# Patient Record
Sex: MALE | Race: WHITE
[De-identification: names, ages, dates, MRNs, and addresses within clinical notes are randomized per-mention and may not be internally consistent; named-entity substitution may affect disease eponyms.]

---

## 2020-08-14 ENCOUNTER — HOSPITAL ENCOUNTER (OUTPATIENT)
Dept: HOSPITAL 35 - MRI | Age: 75
End: 2020-08-14
Attending: ORTHOPAEDIC SURGERY
Payer: COMMERCIAL

## 2020-08-14 DIAGNOSIS — M51.26: Primary | ICD-10-CM

## 2020-08-14 DIAGNOSIS — M47.816: ICD-10-CM

## 2020-08-14 DIAGNOSIS — M54.41: ICD-10-CM

## 2020-08-14 DIAGNOSIS — Z98.890: ICD-10-CM

## 2020-08-14 DIAGNOSIS — G89.29: ICD-10-CM

## 2020-08-14 DIAGNOSIS — M54.42: ICD-10-CM

## 2020-08-14 DIAGNOSIS — M48.07: ICD-10-CM

## 2020-08-14 LAB
BUN SERPL-MCNC: 53 MG/DL (ref 7–18)
CREAT SERPL-MCNC: 1.4 MG/DL (ref 0.7–1.3)

## 2020-08-28 ENCOUNTER — HOSPITAL ENCOUNTER (OUTPATIENT)
Dept: HOSPITAL 35 - RAD | Age: 75
End: 2020-08-28
Attending: ORTHOPAEDIC SURGERY
Payer: COMMERCIAL

## 2020-08-28 DIAGNOSIS — M25.78: ICD-10-CM

## 2020-08-28 DIAGNOSIS — I70.0: ICD-10-CM

## 2020-08-28 DIAGNOSIS — M51.35: ICD-10-CM

## 2020-08-28 DIAGNOSIS — M51.37: Primary | ICD-10-CM

## 2020-08-28 DIAGNOSIS — M48.07: ICD-10-CM

## 2020-09-21 ENCOUNTER — HOSPITAL ENCOUNTER (OUTPATIENT)
Dept: HOSPITAL 35 - LAB | Age: 75
End: 2020-09-21
Attending: ORTHOPAEDIC SURGERY
Payer: COMMERCIAL

## 2020-09-21 DIAGNOSIS — Z01.812: Primary | ICD-10-CM

## 2020-09-21 DIAGNOSIS — Z20.828: ICD-10-CM

## 2020-09-21 LAB
ALBUMIN SERPL-MCNC: 3.5 G/DL (ref 3.4–5)
ALT SERPL-CCNC: 38 U/L (ref 30–65)
ANION GAP SERPL CALC-SCNC: 10 MMOL/L (ref 7–16)
APTT BLD: 26.7 SECONDS (ref 24.5–32.8)
AST SERPL-CCNC: 21 U/L (ref 15–37)
BASOPHILS NFR BLD AUTO: 0.7 % (ref 0–2)
BILIRUB SERPL-MCNC: 0.7 MG/DL (ref 0.2–1)
BILIRUB UR-MCNC: NEGATIVE MG/DL
BUN SERPL-MCNC: 21 MG/DL (ref 7–18)
CALCIUM SERPL-MCNC: 9.4 MG/DL (ref 8.5–10.1)
CHLORIDE SERPL-SCNC: 102 MMOL/L (ref 98–107)
CO2 SERPL-SCNC: 27 MMOL/L (ref 21–32)
COLOR UR: YELLOW
CREAT SERPL-MCNC: 1 MG/DL (ref 0.7–1.3)
EOSINOPHIL NFR BLD: 2.7 % (ref 0–3)
ERYTHROCYTE [DISTWIDTH] IN BLOOD BY AUTOMATED COUNT: 13.4 % (ref 10.5–14.5)
GLUCOSE SERPL-MCNC: 122 MG/DL (ref 74–106)
GRANULOCYTES NFR BLD MANUAL: 70.8 % (ref 36–66)
HCT VFR BLD CALC: 38.2 % (ref 42–52)
HGB BLD-MCNC: 12.9 GM/DL (ref 14–18)
INR PPP: 1
KETONES UR STRIP-MCNC: NEGATIVE MG/DL
LYMPHOCYTES NFR BLD AUTO: 15.7 % (ref 24–44)
MAGNESIUM SERPL-MCNC: 2 MG/DL (ref 1.8–2.4)
MCH RBC QN AUTO: 30.1 PG (ref 26–34)
MCHC RBC AUTO-ENTMCNC: 33.8 G/DL (ref 28–37)
MCV RBC: 89.2 FL (ref 80–100)
MONOCYTES NFR BLD: 10.1 % (ref 1–8)
NEUTROPHILS # BLD: 8.9 THOU/UL (ref 1.4–8.2)
PLATELET # BLD: 258 THOU/UL (ref 150–400)
POTASSIUM SERPL-SCNC: 4.3 MMOL/L (ref 3.5–5.1)
PROT SERPL-MCNC: 6.7 G/DL (ref 6.4–8.2)
PROTHROMBIN TIME: 10.7 SECONDS (ref 9.3–11.4)
RBC # BLD AUTO: 4.28 MIL/UL (ref 4.5–6)
RBC # UR STRIP: NEGATIVE /UL
SODIUM SERPL-SCNC: 139 MMOL/L (ref 136–145)
SP GR UR STRIP: 1.01 (ref 1–1.03)
URINE CLARITY: CLEAR
URINE GLUCOSE-RANDOM*: NEGATIVE
URINE LEUKOCYTES-REFLEX: NEGATIVE
URINE NITRITE-REFLEX: NEGATIVE
URINE PROTEIN (DIPSTICK): NEGATIVE
UROBILINOGEN UR STRIP-ACNC: 0.2 E.U./DL (ref 0.2–1)
WBC # BLD AUTO: 12.6 THOU/UL (ref 4–11)

## 2020-09-24 ENCOUNTER — HOSPITAL ENCOUNTER (INPATIENT)
Dept: HOSPITAL 35 - 4S | Age: 75
LOS: 4 days | Discharge: HOME HEALTH | DRG: 517 | End: 2020-09-28
Attending: ORTHOPAEDIC SURGERY | Admitting: ORTHOPAEDIC SURGERY
Payer: COMMERCIAL

## 2020-09-24 VITALS — DIASTOLIC BLOOD PRESSURE: 47 MMHG | SYSTOLIC BLOOD PRESSURE: 101 MMHG

## 2020-09-24 VITALS — DIASTOLIC BLOOD PRESSURE: 51 MMHG | SYSTOLIC BLOOD PRESSURE: 123 MMHG

## 2020-09-24 VITALS — HEIGHT: 70.98 IN | BODY MASS INDEX: 35 KG/M2 | WEIGHT: 250 LBS

## 2020-09-24 VITALS — DIASTOLIC BLOOD PRESSURE: 73 MMHG | SYSTOLIC BLOOD PRESSURE: 131 MMHG

## 2020-09-24 VITALS — SYSTOLIC BLOOD PRESSURE: 135 MMHG | DIASTOLIC BLOOD PRESSURE: 69 MMHG

## 2020-09-24 DIAGNOSIS — Z79.899: ICD-10-CM

## 2020-09-24 DIAGNOSIS — M48.062: Primary | ICD-10-CM

## 2020-09-24 DIAGNOSIS — D72.829: ICD-10-CM

## 2020-09-24 DIAGNOSIS — Z85.828: ICD-10-CM

## 2020-09-24 DIAGNOSIS — I25.10: ICD-10-CM

## 2020-09-24 DIAGNOSIS — E78.5: ICD-10-CM

## 2020-09-24 DIAGNOSIS — M48.07: ICD-10-CM

## 2020-09-24 DIAGNOSIS — Z95.5: ICD-10-CM

## 2020-09-24 DIAGNOSIS — Z87.891: ICD-10-CM

## 2020-09-24 DIAGNOSIS — Z79.82: ICD-10-CM

## 2020-09-24 DIAGNOSIS — I10: ICD-10-CM

## 2020-09-24 DIAGNOSIS — Z96.653: ICD-10-CM

## 2020-09-24 DIAGNOSIS — M51.17: ICD-10-CM

## 2020-09-24 PROCEDURE — 01NB0ZZ RELEASE LUMBAR NERVE, OPEN APPROACH: ICD-10-PCS | Performed by: ORTHOPAEDIC SURGERY

## 2020-09-24 PROCEDURE — 56524: CPT

## 2020-09-24 PROCEDURE — 50010 RENAL EXPLORATION: CPT

## 2020-09-24 PROCEDURE — 01NR0ZZ RELEASE SACRAL NERVE, OPEN APPROACH: ICD-10-PCS | Performed by: ORTHOPAEDIC SURGERY

## 2020-09-24 PROCEDURE — 50701: CPT

## 2020-09-24 PROCEDURE — 50712: CPT

## 2020-09-24 PROCEDURE — 51878: CPT

## 2020-09-24 PROCEDURE — 51412: CPT

## 2020-09-24 PROCEDURE — 56529: CPT

## 2020-09-24 PROCEDURE — 62110: CPT

## 2020-09-24 PROCEDURE — 62900: CPT

## 2020-09-24 PROCEDURE — 10102: CPT

## 2020-09-24 PROCEDURE — 50402: CPT

## 2020-09-24 PROCEDURE — 70005: CPT

## 2020-09-24 PROCEDURE — 56528: CPT

## 2020-09-24 PROCEDURE — 50101: CPT

## 2020-09-24 PROCEDURE — 56526: CPT

## 2020-09-24 NOTE — NUR
ASSESSMENT:CM REVIEWED CHART AND MET WITH PT. PT IS S/P BILATERAL LAMINECTOMY.
PT IS ALERT AND ORIENTED X4. PT REPORTS LIVING IN A HOUSE ALONE. PTS GOOD
FRIEND KESHA IS AT THE BEDSIDE. PT REPORTS HE HAS A CANE AND WALKER AT HOME
FOR AMBULATION. PT REPORTS BEING INDEPENDENT WUTH ADLS, PT STATES HE HAS HAD
HH IN THE PAST BUT UNSURE THE AGENCY. PT IS TO WORK WITH THERAPIES. PT DOES
NOT ANTICIPATE ANY NEEDS AT DISCHARGE. CM WILL CONTINUE TO FOLLOW.

## 2020-09-24 NOTE — NUR
PT ARRIVED AT 1325 PT ALERT XS 4. FEMALE FRIEND AT BEDSIDE. PCA PUMP WITH
FENTANAL SET UP BY 2 NURSES AND INFUSING AS ORDERED. PT'S ADMIT PAPERWORK XS 3
DONE AND COMPUTER V. 97.7 18 77 131/73 LEFT ARM. O2 SAT = 100 % WEIGHT= 250
LBS HEIGHT = 5'11'' CO 2 MONITOR. CONNECTED. PT PLEASANT AND COOPERATIVE WITH
CARE.

## 2020-09-25 VITALS — DIASTOLIC BLOOD PRESSURE: 52 MMHG | SYSTOLIC BLOOD PRESSURE: 118 MMHG

## 2020-09-25 VITALS — DIASTOLIC BLOOD PRESSURE: 50 MMHG | SYSTOLIC BLOOD PRESSURE: 147 MMHG

## 2020-09-25 VITALS — DIASTOLIC BLOOD PRESSURE: 49 MMHG | SYSTOLIC BLOOD PRESSURE: 109 MMHG

## 2020-09-25 LAB
ANION GAP SERPL CALC-SCNC: 6 MMOL/L (ref 7–16)
BASOPHILS NFR BLD AUTO: 0.1 % (ref 0–2)
BUN SERPL-MCNC: 26 MG/DL (ref 7–18)
CALCIUM SERPL-MCNC: 8.7 MG/DL (ref 8.5–10.1)
CHLORIDE SERPL-SCNC: 102 MMOL/L (ref 98–107)
CO2 SERPL-SCNC: 29 MMOL/L (ref 21–32)
CREAT SERPL-MCNC: 1.1 MG/DL (ref 0.7–1.3)
EOSINOPHIL NFR BLD: 0 % (ref 0–3)
ERYTHROCYTE [DISTWIDTH] IN BLOOD BY AUTOMATED COUNT: 13.5 % (ref 10.5–14.5)
GLUCOSE SERPL-MCNC: 147 MG/DL (ref 74–106)
GRANULOCYTES NFR BLD MANUAL: 83.6 % (ref 36–66)
HCT VFR BLD CALC: 31.2 % (ref 42–52)
HGB BLD-MCNC: 10.6 GM/DL (ref 14–18)
LYMPHOCYTES NFR BLD AUTO: 6.5 % (ref 24–44)
MAGNESIUM SERPL-MCNC: 2.1 MG/DL (ref 1.8–2.4)
MCH RBC QN AUTO: 30.3 PG (ref 26–34)
MCHC RBC AUTO-ENTMCNC: 33.8 G/DL (ref 28–37)
MCV RBC: 89.6 FL (ref 80–100)
MONOCYTES NFR BLD: 9.8 % (ref 1–8)
NEUTROPHILS # BLD: 14.4 THOU/UL (ref 1.4–8.2)
PLATELET # BLD: 222 THOU/UL (ref 150–400)
POTASSIUM SERPL-SCNC: 4.5 MMOL/L (ref 3.5–5.1)
RBC # BLD AUTO: 3.48 MIL/UL (ref 4.5–6)
SODIUM SERPL-SCNC: 137 MMOL/L (ref 136–145)
WBC # BLD AUTO: 17.2 THOU/UL (ref 4–11)

## 2020-09-25 NOTE — NUR
DR CASILLAS HERE AND STATED PATIENT MAY RAISE HEAD TO 30 DEGREES AS TOLERATED
TODAY 9/25/20 AND THEN 9/26/20 ADVANCE AS TOLERATED TOWARD NORMAL SITTING.

## 2020-09-25 NOTE — NUR
PT ON THE FENTANYL PCA. PAIN AT SHIFT CHANGE WAS 5/10. PAIN AT 2100 WAS DOWN
TO 3/10. PT WANTS TO USE THE PERCOCET INSTEAD OF THE PCA PUMP. PAIN DOWN TO 1
AT 2300HRS. IN 4 HOURS, PT HAD USED ONLY 30MCG OF THE FENTANYL. PCA DCD AT
0100, PT STATING HIS PAIN AT A ZERO..

## 2020-09-25 NOTE — O
[facility]
[place]   [address]                     OPERATIVE REPORT              
_______________________________________________________________________________
 
Name:       EDUIN ECHAVARRIA                  Room #:         442-P       ADM IN  
M.R.#:      [id number]                       Account #:      [id number]  
Admission:  09/24/20    Attend Phys:    Jaswinder Ordonez MD  
Discharge:              Date of Birth:  [birth year]  
                                                          Report #: [id number]
                                                                    [id number]   
_______________________________________________________________________________
THIS REPORT FOR:  
 
cc:  Jono Carrizales MD,Jono Ordonez,Jaswinder MARTINI MD                                          ~
CC: Jaswinder Carrizales
 
DATE OF SERVICE:  09/24/2020
 
 
PREOPERATIVE DIAGNOSES:  L2-S1 degenerative disk disease, spondylosis L2-S1
stenosis; previous decompression L4-L5 and L5-S1; foraminal stenosis, left much
greater than right multilevel; right L4-L5 herniated nucleus pulposus; low back
pain, radiculopathy and neurogenic claudication.
 
POSTOPERATIVE DIAGNOSES:  L2-S1 degenerative disk disease, spondylosis L2-S1
stenosis; previous decompression L4-L5 and L5-S1; foraminal stenosis, left much
greater than right multilevel; right L4-L5 herniated nucleus pulposus; low back
pain, radiculopathy and neurogenic claudication.  Additionally, postoperatively
is dural laceration.
 
PROCEDURES:  Bilateral laminectomy with partial facetectomy and neural
foraminotomy of L2, bilateral laminectomy with partial facetectomy and neural
foraminotomy L3, bilateral laminectomy with partial facetectomy and neural
foraminotomy redo L4, bilateral laminectomy with partial facetectomy and neural
foraminotomy redo L5, bilateral laminectomy with partial facetectomy and neural
foraminotomy redo S1.
 
SURGEON:  Jaswinder Ordonez MD
 
ASSISTANT SURGEON:  DIOMEDES Patel.
 
ANESTHETIC:  General via endotracheal tube.
 
INDICATIONS:  The patient has had intractable back and bilateral leg pain.  He
has proved refractory to all forms of multimodality conservative management.  He
is requesting we proceed with operative intervention.  He understands the risks
of surgery to be death, DVT, pulmonary embolism, paraplegia, loss of bowel and
bladder function, loss of sexual function, possibility of bleeding, bleeding
requiring transfusion, transfusion attendant risks of AIDS and hepatitis
infection, instability, dural laceration and recurrence.
 
DESCRIPTION OF PROCEDURE:  He was brought to the operating room, administered
general anesthesia via endotracheal tube.  Lower extremities were treated with
MARIE hose and intermittent compression stockings, Oneill catheter was placed.  He
 
 
 
[address]   [address]                     OPERATIVE REPORT              
_______________________________________________________________________________
 
Name:       EDUIN ECHAVARRIA KOKI                  Room #:         442-P       [facility] [place]  
.R.#:      [id number]                       Account #:      [id number]  
Admission:  09/24/20    Attend Phys:    Jaswinder Ordonez MD  
Discharge:              Date of Birth:  [birth year]  
                                                          Report #: [id number]
                                                                    [id number]   
_______________________________________________________________________________
was positioned on the Perfecto table in the prone position with all bony
prominences padded appropriately.  Care was taken to ensure the shoulders were
not abducted more than 90 degrees or the elbows flexed more than 90 degrees. 
There was no undue pressure on the cubital or carpal canals.  The area of the
anterior superior iliac spine was well padded to protect the lateral femoral
cutaneous nerve.  Hips and knees were well padded and there was no pressure on
the dorsum of the feet.  The patient's low back was defatted with alcohol,
visualized under fluoroscopy and the pedicles of L2 through S1 were marked on
the patient's back for surgical reference.  We then infiltrated the skin with
0.5% Marcaine, 1:200,000 epinephrine and sharp dissection was continued down
through the skin and the subcutaneous tissues to the level of the deep fascia. 
At the level of the deep fascia, the tips of spinous processes that remained
were subperiosteally exposed.  Care was taken to not inadvertently penetrate the
unprotected dura at the L4, L5 and S1 levels.  We eventually obtained a
subperiosteal dissection with both sharp and cautery dissection, maintaining the
facet capsules at all levels at all times and exposed the laminas from pars to
pars from L2 to the sacrum.  The deep self-retaining retractors were then
placed.  We obtained an intraoperative x-ray just to document our level and once
that was documented, we removed the spinous process of L2, L3 and the remnant of
L4 and carefully dissected the previous areas of decompression, L4, L5 and S1 to
avoid inadvertent dural penetration.  We defined the bony margins and got a good
subperiosteal dissection.  We then used the high speed drill to thin the lamina
of L2, L3 and L4 remnant to its anterior cortex and then a micro curette was
used to separate the ligamentum flavum from the undersurface of the lamina. 
Then, the lamina was resected.  Once the lamina was resected to obtain a central
decompression, we resected the intervening ligamentum flavum, then the following
lamina, so after the L3 lamina was removed, we resected the remnant of L4.  With
the remnant of L4, we had a complete central decompression.  We then turned our
attention to the redo levels.  This was re-exposed using micro curettes under
loupe magnification and headlight illumination  the scarred dura from
the undersurface of the lamina and then resecting the lamina or the ligament,
whichever was the case.  We eventually had a wide central decompression from
L2-S1.  With the decompressions completed centrally, we then started cephalad on
the right and marched down the lateral recess performing partial facetectomies
and ligament resection to affect a lateral recess decompression at the L2, L3,
L4, L5 and S1 levels.  Once this was accomplished, we performed neural foraminal
decompressions on the right, completely decompressing the nerve root.  We then
explored the L4-L5 disk and although there was a bulge due to the length and the
degree of decompression, it was providing no focal pressure on the L5 nerve root
on the right.  We therefore left the disk intact.  We then turned our attention
to the left side and the patient's dissection was widened as previously
described, performing partial facetectomies and ligament resections until we
neared the interface between the virgin L3 level and the L4 level.  At this
level, we ran into some scar that was severely adherent.  It was also adherent
to a spur that was penetrating and almost to the floor of the canal off the
facet and in the process of dissection, a dural laceration was incurred.  The
 
 
 
[address]   [address]                     OPERATIVE REPORT              
_______________________________________________________________________________
 
Name:       EDUIN ECHAVARRIA KOKI                  Room #:         442-P       [facility] [place]  
..#:      [id number]                       Account #:      [id number]  
Admission:  09/24/20    Attend Phys:    Jaswinder Ordonez MD  
Discharge:              Date of Birth:  [birth year]  
                                                          Report #: [id number]
                                                                    [id number]   
_______________________________________________________________________________
dural laceration clearly showed no evidence of injury to the nerve roots.  It
had defined edges and we were able to place 5-6 stitches of 6-0 Prolene in the
laceration and it was watertight for the remainder of the case.  We then
continued the dissection completing the lateral recess and neural foraminal
decompressions on the left at L2, L3, L4 and L5 and S1.  With the decompression
now complete, the dural laceration repaired and apparently dry for the entire
case, we irrigated with a liter of antibiotic-containing solution, placed
Duragen over the laceration and then DuraSeal to complete the repair of the
dura.  We then closed the deep fascial layer with 0 Ethibond in figure-of-eight
interrupted fashion.  We oversewed that repair with 0 Prolene in running fashion
and then deep subQ with 0 Vicryl, superficial subQ with 2-0 Vicryl and skin with
stainless steel staples, dressed the wound with Xeroform, sterile dressing,
sponges and a bioclusive.  The patient tolerated the procedure well and was
physiologically stable throughout.  The small half-inch dural laceration was the
only technical misadventure.  It was repaired uneventfully and appeared
watertight throughout the remainder of the case.  The patient was being
transported to the recovery room for close serial neurovascular observation and
discharge for once stable to continue prophylactic antibiotic and serial
neurovascular exams.
 
 
 
 
 
 
 
 
 
 
 
 
 
 
 
 
 
 
 
 
 
 
 
 
 
  <ELECTRONICALLY SIGNED>
   By: Jaswinder Ordonez MD          
  09/25/20     0944
D: 09/24/20 1206                           _____________________________________
T: 09/24/20 1311                           Jaswinder Ordonez MD            /nt

## 2020-09-26 VITALS — DIASTOLIC BLOOD PRESSURE: 55 MMHG | SYSTOLIC BLOOD PRESSURE: 150 MMHG

## 2020-09-26 VITALS — SYSTOLIC BLOOD PRESSURE: 150 MMHG | DIASTOLIC BLOOD PRESSURE: 55 MMHG

## 2020-09-26 VITALS — DIASTOLIC BLOOD PRESSURE: 55 MMHG | SYSTOLIC BLOOD PRESSURE: 131 MMHG

## 2020-09-26 VITALS — SYSTOLIC BLOOD PRESSURE: 119 MMHG | DIASTOLIC BLOOD PRESSURE: 45 MMHG

## 2020-09-26 LAB
ANION GAP SERPL CALC-SCNC: 4 MMOL/L (ref 7–16)
BASOPHILS NFR BLD AUTO: 0.2 % (ref 0–2)
BUN SERPL-MCNC: 17 MG/DL (ref 7–18)
CALCIUM SERPL-MCNC: 8.8 MG/DL (ref 8.5–10.1)
CHLORIDE SERPL-SCNC: 101 MMOL/L (ref 98–107)
CO2 SERPL-SCNC: 32 MMOL/L (ref 21–32)
CREAT SERPL-MCNC: 1 MG/DL (ref 0.7–1.3)
EOSINOPHIL NFR BLD: 0.4 % (ref 0–3)
ERYTHROCYTE [DISTWIDTH] IN BLOOD BY AUTOMATED COUNT: 13.7 % (ref 10.5–14.5)
GLUCOSE SERPL-MCNC: 120 MG/DL (ref 74–106)
GRANULOCYTES NFR BLD MANUAL: 74.2 % (ref 36–66)
HCT VFR BLD CALC: 32.2 % (ref 42–52)
HGB BLD-MCNC: 10.8 GM/DL (ref 14–18)
LYMPHOCYTES NFR BLD AUTO: 13.5 % (ref 24–44)
MCH RBC QN AUTO: 30.3 PG (ref 26–34)
MCHC RBC AUTO-ENTMCNC: 33.6 G/DL (ref 28–37)
MCV RBC: 90.1 FL (ref 80–100)
MONOCYTES NFR BLD: 11.7 % (ref 1–8)
NEUTROPHILS # BLD: 9.5 THOU/UL (ref 1.4–8.2)
PLATELET # BLD: 225 THOU/UL (ref 150–400)
POTASSIUM SERPL-SCNC: 4 MMOL/L (ref 3.5–5.1)
RBC # BLD AUTO: 3.57 MIL/UL (ref 4.5–6)
SODIUM SERPL-SCNC: 137 MMOL/L (ref 136–145)
WBC # BLD AUTO: 12.8 THOU/UL (ref 4–11)

## 2020-09-26 NOTE — NUR
PT BEEN RESTING QUIETLY MOST OF THE NIGHT. HOB ELEVATED TO 30 DEGREES AND
PATIENT TOLERATING FINE. NO COMPLAINS OF HEADACHE. VSS. HAM TO D/D WITH GOOD
U/O. DRSG TO LOWER BACK C/D/I.

## 2020-09-26 NOTE — NUR
PT IS A&OX4, VSS, UP WITH ONE PERSON ASSIST TO CHAIR. PT SAW PATIENT TODAY.
PATIENT IS IN CALM MOOD AND PLEASANT. PATIENT DENIES PAIN. RECOVERY IS GOING
WELL. FALL PRECAUTIONS IN PLACE, WILL CONTINUE TO MONITOR.

## 2020-09-27 VITALS — DIASTOLIC BLOOD PRESSURE: 53 MMHG | SYSTOLIC BLOOD PRESSURE: 126 MMHG

## 2020-09-27 VITALS — SYSTOLIC BLOOD PRESSURE: 133 MMHG | DIASTOLIC BLOOD PRESSURE: 65 MMHG

## 2020-09-27 VITALS — SYSTOLIC BLOOD PRESSURE: 135 MMHG | DIASTOLIC BLOOD PRESSURE: 72 MMHG

## 2020-09-27 LAB
ANION GAP SERPL CALC-SCNC: 9 MMOL/L (ref 7–16)
BASOPHILS NFR BLD AUTO: 0.3 % (ref 0–2)
BUN SERPL-MCNC: 17 MG/DL (ref 7–18)
CALCIUM SERPL-MCNC: 9 MG/DL (ref 8.5–10.1)
CHLORIDE SERPL-SCNC: 99 MMOL/L (ref 98–107)
CO2 SERPL-SCNC: 27 MMOL/L (ref 21–32)
CREAT SERPL-MCNC: 0.9 MG/DL (ref 0.7–1.3)
EOSINOPHIL NFR BLD: 1.9 % (ref 0–3)
ERYTHROCYTE [DISTWIDTH] IN BLOOD BY AUTOMATED COUNT: 13.7 % (ref 10.5–14.5)
GLUCOSE SERPL-MCNC: 121 MG/DL (ref 74–106)
GRANULOCYTES NFR BLD MANUAL: 72.3 % (ref 36–66)
HCT VFR BLD CALC: 32 % (ref 42–52)
HGB BLD-MCNC: 10.8 GM/DL (ref 14–18)
LYMPHOCYTES NFR BLD AUTO: 14.8 % (ref 24–44)
MCH RBC QN AUTO: 30.2 PG (ref 26–34)
MCHC RBC AUTO-ENTMCNC: 33.7 G/DL (ref 28–37)
MCV RBC: 89.5 FL (ref 80–100)
MONOCYTES NFR BLD: 10.7 % (ref 1–8)
NEUTROPHILS # BLD: 9.3 THOU/UL (ref 1.4–8.2)
PLATELET # BLD: 231 THOU/UL (ref 150–400)
POTASSIUM SERPL-SCNC: 4.2 MMOL/L (ref 3.5–5.1)
RBC # BLD AUTO: 3.58 MIL/UL (ref 4.5–6)
SODIUM SERPL-SCNC: 135 MMOL/L (ref 136–145)
WBC # BLD AUTO: 12.8 THOU/UL (ref 4–11)

## 2020-09-27 NOTE — NUR
PT IS PLEASNT. COOPERATIVE. TOLERATING HOB AT 30 DEGREES THROUGHOUT THE NOC.
DENIES NAUSEA OR HEADACHE. AFEBRILE. GOOD U/O PER HAM. PAIN WELL MANAGED BY
PERCOCET.

## 2020-09-27 NOTE — NUR
PT ASSESSED AT START OF SHIFT. PROGRESSING WELL. WALKED W/ THERAPYIST DOWN THE
LUTHER AND BACK USING WALKER. STATED BACK FEELS SO MUCH BETTER. IS TAKING PAIN
MEDS. FATOUMATA DC'D PER PROTOCOL. DSNG W/ DRIED DRAINAGE. PLAN FOR DC TOMORROW
PER

## 2020-09-28 VITALS — DIASTOLIC BLOOD PRESSURE: 69 MMHG | SYSTOLIC BLOOD PRESSURE: 129 MMHG

## 2020-09-28 VITALS — DIASTOLIC BLOOD PRESSURE: 94 MMHG | SYSTOLIC BLOOD PRESSURE: 192 MMHG

## 2020-09-28 VITALS — SYSTOLIC BLOOD PRESSURE: 129 MMHG | DIASTOLIC BLOOD PRESSURE: 69 MMHG

## 2020-09-28 LAB
ANION GAP SERPL CALC-SCNC: 8 MMOL/L (ref 7–16)
BASOPHILS NFR BLD AUTO: 0.5 % (ref 0–2)
BUN SERPL-MCNC: 14 MG/DL (ref 7–18)
CALCIUM SERPL-MCNC: 9.3 MG/DL (ref 8.5–10.1)
CHLORIDE SERPL-SCNC: 96 MMOL/L (ref 98–107)
CO2 SERPL-SCNC: 29 MMOL/L (ref 21–32)
CREAT SERPL-MCNC: 0.9 MG/DL (ref 0.7–1.3)
EOSINOPHIL NFR BLD: 2.2 % (ref 0–3)
ERYTHROCYTE [DISTWIDTH] IN BLOOD BY AUTOMATED COUNT: 13.7 % (ref 10.5–14.5)
GLUCOSE SERPL-MCNC: 132 MG/DL (ref 74–106)
GRANULOCYTES NFR BLD MANUAL: 76.3 % (ref 36–66)
HCT VFR BLD CALC: 32.2 % (ref 42–52)
HGB BLD-MCNC: 11 GM/DL (ref 14–18)
LYMPHOCYTES NFR BLD AUTO: 11.3 % (ref 24–44)
MCH RBC QN AUTO: 30.4 PG (ref 26–34)
MCHC RBC AUTO-ENTMCNC: 34.1 G/DL (ref 28–37)
MCV RBC: 89.3 FL (ref 80–100)
MONOCYTES NFR BLD: 9.7 % (ref 1–8)
NEUTROPHILS # BLD: 9.2 THOU/UL (ref 1.4–8.2)
PLATELET # BLD: 258 THOU/UL (ref 150–400)
POTASSIUM SERPL-SCNC: 4.1 MMOL/L (ref 3.5–5.1)
RBC # BLD AUTO: 3.61 MIL/UL (ref 4.5–6)
SODIUM SERPL-SCNC: 133 MMOL/L (ref 136–145)
WBC # BLD AUTO: 12 THOU/UL (ref 4–11)

## 2020-09-28 NOTE — NUR
PT UP IN BEDSIDE CHAIR FOR BREAKFAST. TOOK AM MEDS. PT HAS NO PAIN AND IS
VOIDING W/O DIFF HAM WAS REMOVED. PT TO DISCHARGE TO HOME WITH HOME HEALTH
TODAY.

## 2020-09-28 NOTE — NUR
PT AMBULATING TO BATHROOM WITH WALKER AND ASSIST X1 AND IS TOLERATING FAIR.
PERCOCET PROVIDING PAIN RELIEF ALTHOUGH IT DOES MAKE PT A LITTLE CONFUSED.  PT
DID FINALLY VOID POST CATHETER REMOVAL.  RESTING COMFORTABLY.  NO NEEDS
VOICED.  CALL LIGHT WITHIN REACH.  FREQUENT OBSERVATION.

## 2020-11-12 ENCOUNTER — HOSPITAL ENCOUNTER (OUTPATIENT)
Dept: HOSPITAL 35 - MRI | Age: 75
End: 2020-11-12
Attending: ORTHOPAEDIC SURGERY
Payer: COMMERCIAL

## 2020-11-12 DIAGNOSIS — M51.26: ICD-10-CM

## 2020-11-12 DIAGNOSIS — G89.29: ICD-10-CM

## 2020-11-12 DIAGNOSIS — M41.86: Primary | ICD-10-CM

## 2020-11-12 DIAGNOSIS — M48.07: ICD-10-CM

## 2020-11-12 LAB
BUN SERPL-MCNC: 19 MG/DL (ref 7–18)
CREAT SERPL-MCNC: 1.3 MG/DL (ref 0.7–1.3)

## 2020-11-20 ENCOUNTER — HOSPITAL ENCOUNTER (OUTPATIENT)
Dept: HOSPITAL 35 - LAB | Age: 75
End: 2020-11-20
Attending: ORTHOPAEDIC SURGERY
Payer: COMMERCIAL

## 2020-11-20 DIAGNOSIS — Z20.828: Primary | ICD-10-CM

## 2020-11-20 LAB
ALBUMIN SERPL-MCNC: 3.9 G/DL (ref 3.4–5)
ALT SERPL-CCNC: 31 U/L (ref 30–65)
ANION GAP SERPL CALC-SCNC: 11 MMOL/L (ref 7–16)
APTT BLD: 24.3 SECONDS (ref 24.5–32.8)
AST SERPL-CCNC: 21 U/L (ref 15–37)
BASOPHILS NFR BLD AUTO: 0.3 % (ref 0–2)
BILIRUB SERPL-MCNC: 0.8 MG/DL (ref 0.2–1)
BILIRUB UR-MCNC: NEGATIVE MG/DL
BUN SERPL-MCNC: 19 MG/DL (ref 7–18)
CALCIUM SERPL-MCNC: 9.9 MG/DL (ref 8.5–10.1)
CHLORIDE SERPL-SCNC: 96 MMOL/L (ref 98–107)
CO2 SERPL-SCNC: 28 MMOL/L (ref 21–32)
COLOR UR: YELLOW
CREAT SERPL-MCNC: 1.2 MG/DL (ref 0.7–1.3)
EOSINOPHIL NFR BLD: 1.3 % (ref 0–3)
ERYTHROCYTE [DISTWIDTH] IN BLOOD BY AUTOMATED COUNT: 12.7 % (ref 10.5–14.5)
GLUCOSE SERPL-MCNC: 116 MG/DL (ref 74–106)
GRANULOCYTES NFR BLD MANUAL: 72.8 % (ref 36–66)
HCT VFR BLD CALC: 42.6 % (ref 42–52)
HGB BLD-MCNC: 13.9 GM/DL (ref 14–18)
INR PPP: 1
KETONES UR STRIP-MCNC: NEGATIVE MG/DL
LYMPHOCYTES NFR BLD AUTO: 17.6 % (ref 24–44)
MAGNESIUM SERPL-MCNC: 2.1 MG/DL (ref 1.8–2.4)
MCH RBC QN AUTO: 29.5 PG (ref 26–34)
MCHC RBC AUTO-ENTMCNC: 32.7 G/DL (ref 28–37)
MCV RBC: 90.3 FL (ref 80–100)
MONOCYTES NFR BLD: 8 % (ref 1–8)
NEUTROPHILS # BLD: 10.4 THOU/UL (ref 1.4–8.2)
PLATELET # BLD: 291 THOU/UL (ref 150–400)
POTASSIUM SERPL-SCNC: 4.1 MMOL/L (ref 3.5–5.1)
PROT SERPL-MCNC: 7 G/DL (ref 6.4–8.2)
PROTHROMBIN TIME: 10.7 SECONDS (ref 9.3–11.4)
RBC # BLD AUTO: 4.72 MIL/UL (ref 4.5–6)
RBC # UR STRIP: NEGATIVE /UL
SODIUM SERPL-SCNC: 135 MMOL/L (ref 136–145)
SP GR UR STRIP: >= 1.03 (ref 1–1.03)
URINE CLARITY: CLEAR
URINE GLUCOSE-RANDOM*: NEGATIVE
URINE LEUKOCYTES-REFLEX: NEGATIVE
URINE NITRITE-REFLEX: NEGATIVE
URINE PROTEIN (DIPSTICK): NEGATIVE
UROBILINOGEN UR STRIP-ACNC: 0.2 E.U./DL (ref 0.2–1)
WBC # BLD AUTO: 14.3 THOU/UL (ref 4–11)

## 2020-11-20 NOTE — EKG
[facility]
[address]
[place]   [address]                     ELECTROCARDIOGRAM REPORT      
_______________________________________________________________________________
 
Name:       IRASEMA ECHAVARRIAY T                  Room #:                     PRE IN  
M..#:      [id number]                       Account #:      [id number]  
Admission:              Attend Phys:    Jaswinder Ordonez MD  
Discharge:              Date of Birth:  [birth year]  
                                                          Report #: [id number]
                                                                    [id number]
_______________________________________________________________________________
THIS REPORT FOR:  
 
cc:  Jono Carrizales MD, Michele MD Santiago, Patrick MD [facility]                                         ~
THIS REPORT FOR:   //name//                          
 
                          [facility]
                                       
Test Date:    2020               Test Time:    13:23:34
Pat Name:     EDUIN ECHAVARRIA               Department:   
Patient ID:   [id number]            Room:          
Gender:                               Technician:   [facility]
:          1945               Requested By: Jaswinder Ordonez
Order Number: [id number] MD:   Christian Lance
                                 Measurements
Intervals                              Axis          
Rate:         55                       P:            44
NV:           176                      QRS:          20
QRSD:         98                       T:            35
QT:           442                                    
QTc:          423                                    
                           Interpretive Statements
Sinus rhythm
Abnormal R-wave progression, early transition
No previous ECG available for comparison
Electronically Signed On 2020 13:29:38 CST by Christian Lance
[email or web address]
 
 
 
 
 
 
 
 
 
 
 
 
 
 
 
 
  <ELECTRONICALLY SIGNED>
   By: Christian Lance MD, FACC    
  20     1329
D: 20 1323                           _____________________________________
T: 20 1323                           Christian Lance MD, FACC      /EPI

## 2020-11-24 ENCOUNTER — HOSPITAL ENCOUNTER (INPATIENT)
Dept: HOSPITAL 35 - TBA | Age: 75
LOS: 3 days | Discharge: HOME HEALTH | DRG: 520 | End: 2020-11-27
Attending: ORTHOPAEDIC SURGERY | Admitting: ORTHOPAEDIC SURGERY
Payer: COMMERCIAL

## 2020-11-24 VITALS — DIASTOLIC BLOOD PRESSURE: 70 MMHG | SYSTOLIC BLOOD PRESSURE: 124 MMHG

## 2020-11-24 VITALS — SYSTOLIC BLOOD PRESSURE: 140 MMHG | DIASTOLIC BLOOD PRESSURE: 71 MMHG

## 2020-11-24 VITALS — SYSTOLIC BLOOD PRESSURE: 124 MMHG | DIASTOLIC BLOOD PRESSURE: 64 MMHG

## 2020-11-24 VITALS — DIASTOLIC BLOOD PRESSURE: 74 MMHG | SYSTOLIC BLOOD PRESSURE: 132 MMHG

## 2020-11-24 VITALS — SYSTOLIC BLOOD PRESSURE: 130 MMHG | DIASTOLIC BLOOD PRESSURE: 77 MMHG

## 2020-11-24 VITALS — WEIGHT: 199 LBS | BODY MASS INDEX: 27.86 KG/M2 | HEIGHT: 70.98 IN

## 2020-11-24 VITALS — DIASTOLIC BLOOD PRESSURE: 70 MMHG | SYSTOLIC BLOOD PRESSURE: 121 MMHG

## 2020-11-24 VITALS — SYSTOLIC BLOOD PRESSURE: 130 MMHG | DIASTOLIC BLOOD PRESSURE: 74 MMHG

## 2020-11-24 DIAGNOSIS — M54.17: ICD-10-CM

## 2020-11-24 DIAGNOSIS — Z86.19: ICD-10-CM

## 2020-11-24 DIAGNOSIS — M48.07: Primary | ICD-10-CM

## 2020-11-24 DIAGNOSIS — I10: ICD-10-CM

## 2020-11-24 DIAGNOSIS — Z96.653: ICD-10-CM

## 2020-11-24 DIAGNOSIS — I25.10: ICD-10-CM

## 2020-11-24 DIAGNOSIS — E78.5: ICD-10-CM

## 2020-11-24 DIAGNOSIS — E78.00: ICD-10-CM

## 2020-11-24 DIAGNOSIS — Z87.891: ICD-10-CM

## 2020-11-24 DIAGNOSIS — Z95.5: ICD-10-CM

## 2020-11-24 PROCEDURE — 70005: CPT

## 2020-11-24 PROCEDURE — 009U3ZZ DRAINAGE OF SPINAL CANAL, PERCUTANEOUS APPROACH: ICD-10-PCS | Performed by: ORTHOPAEDIC SURGERY

## 2020-11-24 PROCEDURE — 62110: CPT

## 2020-11-24 PROCEDURE — 62900: CPT

## 2020-11-24 PROCEDURE — 01NB0ZZ RELEASE LUMBAR NERVE, OPEN APPROACH: ICD-10-PCS | Performed by: ORTHOPAEDIC SURGERY

## 2020-11-24 PROCEDURE — 01NR0ZZ RELEASE SACRAL NERVE, OPEN APPROACH: ICD-10-PCS | Performed by: ORTHOPAEDIC SURGERY

## 2020-11-24 PROCEDURE — 50701: CPT

## 2020-11-24 PROCEDURE — 00QT0ZZ REPAIR SPINAL MENINGES, OPEN APPROACH: ICD-10-PCS | Performed by: ORTHOPAEDIC SURGERY

## 2020-11-24 PROCEDURE — 10102: CPT

## 2020-11-24 PROCEDURE — 56528: CPT

## 2020-11-24 PROCEDURE — 50850: CPT

## 2020-11-24 PROCEDURE — 50101: CPT

## 2020-11-24 PROCEDURE — 50402: CPT

## 2020-11-24 PROCEDURE — 50010 RENAL EXPLORATION: CPT

## 2020-11-24 PROCEDURE — 51878: CPT

## 2020-11-24 PROCEDURE — 56529: CPT

## 2020-11-24 PROCEDURE — 65130 INSERT OCULAR IMPLANT: CPT

## 2020-11-24 PROCEDURE — 00NY0ZZ RELEASE LUMBAR SPINAL CORD, OPEN APPROACH: ICD-10-PCS | Performed by: ORTHOPAEDIC SURGERY

## 2020-11-24 PROCEDURE — 51412: CPT

## 2020-11-24 PROCEDURE — 56524: CPT

## 2020-11-24 NOTE — O
[facility]
[place]   [address]                     OPERATIVE REPORT              
_______________________________________________________________________________
 
Name:       EDUIN ECHAVARRIA                  Room #:         445-P       ADM IN  
M.R.#:      [id number]                       Account #:      [id number]  
Admission:  11/24/20    Attend Phys:    Jaswinder Ordonez MD  
Discharge:              Date of Birth:  [birth year]  
                                                          Report #: [id number]
                                                                    [id number]   
_______________________________________________________________________________
THIS REPORT FOR:  
 
cc:  Jono Carrizales MD,Jono Ordonez,Jaswinder MARTINI MD                                          ~
CC: Jaswinder Carrizales
 
DATE OF SERVICE:  11/24/2020
 
 
PREPROCEDURAL DIAGNOSES:  Pseudomeningocele, previous multilevel spinal
stenosis, status post lumbar laminectomy, neurogenic claudication, neural
foraminal stenosis, right L4-L5, right L5-S1.
 
POSTPROCEDURAL DIAGNOSES:  Pseudomeningocele, previous multilevel spinal
stenosis, status post lumbar laminectomy, neurogenic claudication, neural
foraminal stenosis, right L4-L5, right L5-S1.
 
PROCEDURES:  Exploration of lumbar wound.  Drainage of pseudomeningocele. 
Repair of dural laceration requiring 7 sutures of 6-0 Prolene.  Watertight seal
obtained.  Redo right laminectomy, partial facetectomy, neural foraminotomy of
L5, left redo laminectomy, partial facetectomy and neural foraminotomy of L4-L5,
redo laminectomy, partial facetectomy and neural foraminotomy of L5-S1.
 
SURGEON:  Dr. Jaswinder Ordonez.
 
ASSISTANT SURGEON:  DIOMEDES Murillo
 
ANESTHESIA:  General via endotracheal tube.
 
INDICATIONS:  The patient has had intractable back pain.  He also has components
of leg pain.  Repeat MRI shows a large collection of fluid that certainly could
be accounting for his lower extremity symptoms that were resulting in falls, but
he also had red recurrent stenosis in the neural foramen at L5-S1 on the right
and L4-L5 and L5-S1 on the left.  Decision was made to explore and see if there
was an active dural laceration.
 
The patient had a dural laceration and pseudomeningocele and spinal stenosis,
neural foraminally in the lower lumbar levels.  The patient understood the risks
of surgery to be death, DVT, pulmonary embolism, paraplegia, loss of bowel and
bladder function, loss of sexual function, possibility of bleeding, bleeding
requiring transfusion, transfusion attendant risks of AIDS and hepatitis
infection, instability, the need for revision, prolonged hospital stay, dural
leak, spinal headache, and infection.  He requested we proceed.
 
 
 
 
[address]   [address]                     OPERATIVE REPORT              
_______________________________________________________________________________
 
Name:       EDUIN ECHAVARRIA KOKI                  Room #:         445-P       [facility] [place]  
..#:      [id number]                       Account #:      [id number]  
Admission:  11/24/20    Attend Phys:    Jaswinder Ordonez MD  
Discharge:              Date of Birth:  [birth year]  
                                                          Report #: [id number]
                                                                    [id number]   
_______________________________________________________________________________
DESCRIPTION OF PROCEDURE:  The patient was brought to the operating room,
administered general anesthesia via endotracheal tube.  Lower extremities were
treated with MARIE hose and intermittent compression stockings.  He was positioned
on the Perfecto table in the prone position with all bony prominences padded
appropriately.  Care was taken to ensure the shoulders not abducted more than 90
degrees, the elbows flexed more than 90 degrees and there was no undue pressure
on the cubital or carpal canals.  The area of the anterior superior iliac spine
was well padded to protect the lateral femoral cutaneous nerve.  The hips and
knees were well padded and there was no pressure on the dorsum of the feet.  The
patient's low back was then defatted with alcohol and visualized under
fluoroscopy to determine and marked levels.  We then sterilely prepped and
draped, infiltrated the skin with 0.5% Marcaine, 1:200,000 epinephrine and sharp
dissection was continued down through the skin, the subcutaneous tissues to the
level of the deep fascia.  We encountered in the subcutaneous area of collection
of fluid.  This was drained.  We then entered the deep space below the fascia
and again encountered a significant amount of clear dural fluid.  There was no
evidence of infection.  It was not turbid or cloudy whatsoever.  We set our deep
self-retaining retractors and began to remove the fluid off the dural sac until
we found surprisingly at the superior aspect of the incision on the left several
millimeters from the bony edge, there was a dural laceration that was actively
leaking.  This required additional laminectomy at the L2-L3 level on the left to
gain enough margin laterally to pass a suture.  Eventually, this was
accomplished in redo fashion using micro curettes under loupe magnification and
headlight illumination to separate the scarred dura and the dura lateral to the
tear from the bone and the lateral recess and then it was resected until we had
approximately 8 and 9 mm lateral to the spinal tissues to the dural laceration. 
We then used 6-0 Prolene and passed 7 locking stitches to obtain a completely
watertight dural repair.  There was no compromise.  It was clearly seen and had
great visualization in the suture line.  There was no egress of fluid after the
repair.  We then turned our attention more distalward on the left.  We exposed
the lamina of L4-L5 and L5-S1.  We performed a redo laminectomy, partial
facetectomy and neural foraminotomy to decompress the L4 and the L5 nerve root
on the left.  We then moved to the right side and performed a redo partial
laminectomy, facetectomy and neural foraminotomy to completely decompress the L5
nerve root on the right.  At the completion of which a [facility] elevator
could be passed freely and easily out the neural foramen without any encumbrance
whatsoever.  The patient tolerated the procedure well.  He was physiologically
stable.  He lost about 50 mL of blood.  We passed a liter of antibiotic irrigant
through the wound.  We obtained meticulous hemostasis.  Put a gram of vancomycin
in the wound, closed the deep fascial layer with 0 Ethibond in figure-of-eight
interrupted fashion, deep subQ with 0 Vicryl, superficial subcutaneous with 2-0
Vicryl, skin with subcuticular 3-0, dressed with benzoin, Steri-Strips, sterile
dressing, sponges and a Bioclusive.  The patient tolerated the procedure well. 
There were no technical misadventures.  He was being transported to the recovery
room for closer neurovascular observation discharge for once stable to [address]   [address]                     OPERATIVE REPORT              
_______________________________________________________________________________
 
Name:       EDUIN ECHAVARRIA                  Room #:         445-P       [facility] [place]  
.R.#:      [id number]                       Account #:      [id number]  
Admission:  11/24/20    Attend Phys:    Jaswinder Ordonez MD  
Discharge:              Date of Birth:  [birth year]  
                                                          Report #: [id number]
                                                                    [id number]   
_______________________________________________________________________________
prophylactic antibiotic and serial neurovascular exams.  Final blood loss was 50
mL.  No specimen.  No complications.
 
 
 
 
 
 
 
 
 
 
 
 
 
 
 
 
 
 
 
 
 
 
 
 
 
 
 
 
 
 
 
 
 
 
 
 
 
 
 
 
 
 
                         
   By:                               
                   
D: 11/24/20 0958                           _____________________________________
T: 11/24/20 1132                           Jaswinder Ordonez MD            /nt

## 2020-11-24 NOTE — NUR
PATIENT ADMITTED FROM OR WITH LUMBAR EXPLORATION, REPAIR DURA LEAK LUMBAR
DECOMPRESSION. AQUACELL DRESSING TO LOW BACK AREA. MARIE HOSE IN PLACE, SCD'S IN
PLACE. PATIENT HAS PCA PUMP WITH HYDROMORPHONE 0.2 MG Q 6 MIN. PATIENT C/O
PAIN 7/10. LEFT FOREARM IV IN PLACE WITH LR AT 100CC/HR. VSS. PATIENT C/O
GETTING CONSIPATION, PLACED CALL TO EMILIA/NP, WHO STATES SHE WILL ORDER MEDS.
ASSUMED CARE OF THIS PATIENT FOR THE SHIFT. WILL REPORT TO THE NIGHT NURSE.

## 2020-11-25 VITALS — DIASTOLIC BLOOD PRESSURE: 72 MMHG | SYSTOLIC BLOOD PRESSURE: 128 MMHG

## 2020-11-25 VITALS — SYSTOLIC BLOOD PRESSURE: 128 MMHG | DIASTOLIC BLOOD PRESSURE: 67 MMHG

## 2020-11-25 VITALS — SYSTOLIC BLOOD PRESSURE: 128 MMHG | DIASTOLIC BLOOD PRESSURE: 72 MMHG

## 2020-11-25 VITALS — DIASTOLIC BLOOD PRESSURE: 70 MMHG | SYSTOLIC BLOOD PRESSURE: 135 MMHG

## 2020-11-25 LAB
ANION GAP SERPL CALC-SCNC: 5 MMOL/L (ref 7–16)
BASOPHILS NFR BLD AUTO: 0.1 % (ref 0–2)
BUN SERPL-MCNC: 12 MG/DL (ref 7–18)
CALCIUM SERPL-MCNC: 9 MG/DL (ref 8.5–10.1)
CHLORIDE SERPL-SCNC: 102 MMOL/L (ref 98–107)
CO2 SERPL-SCNC: 30 MMOL/L (ref 21–32)
CREAT SERPL-MCNC: 1 MG/DL (ref 0.7–1.3)
EOSINOPHIL NFR BLD: 0 % (ref 0–3)
ERYTHROCYTE [DISTWIDTH] IN BLOOD BY AUTOMATED COUNT: 12.7 % (ref 10.5–14.5)
GLUCOSE SERPL-MCNC: 132 MG/DL (ref 74–106)
GRANULOCYTES NFR BLD MANUAL: 82.7 % (ref 36–66)
HCT VFR BLD CALC: 33.5 % (ref 42–52)
HGB BLD-MCNC: 11 GM/DL (ref 14–18)
LYMPHOCYTES NFR BLD AUTO: 7.9 % (ref 24–44)
MCH RBC QN AUTO: 29.6 PG (ref 26–34)
MCHC RBC AUTO-ENTMCNC: 32.9 G/DL (ref 28–37)
MCV RBC: 90 FL (ref 80–100)
MONOCYTES NFR BLD: 9.3 % (ref 1–8)
NEUTROPHILS # BLD: 13.1 THOU/UL (ref 1.4–8.2)
PLATELET # BLD: 199 THOU/UL (ref 150–400)
POTASSIUM SERPL-SCNC: 4.2 MMOL/L (ref 3.5–5.1)
RBC # BLD AUTO: 3.72 MIL/UL (ref 4.5–6)
SODIUM SERPL-SCNC: 137 MMOL/L (ref 136–145)
WBC # BLD AUTO: 15.8 THOU/UL (ref 4–11)

## 2020-11-25 NOTE — NUR
PT CARE ASSUMED AT 0700. A&Ox4. URINAL. IV PATENT WITH NO REDNESS OR EDEMA,
SALINE LOCKED. AQUACELL DRESSING CHANGED DUE TO BEING SATURATED. PT/OT ON
BOARD. PAIN MANAGED WELL WITH PAIN MEDICATION ON BOARD. FALL PROTOCOLL IN
PLACE. CALL LIGHT IN REACH. WILL CONTINUE TO MONITOR.

## 2020-11-25 NOTE — NUR
ALERT AND ORIENTED.USING URINAL THRO THE NOC. PAIN IN THE RANGE OF 2-5, USING
DIULADID PCA  WHICH WAS DC/D AT 0600-PT OFFERED PO PAIN MEDS THIS AM WHICH HE
DECLINED. DRSG TO BACK IS C/D/I.AFEBRILE. DOING WELL ON ROOM AIR. DENIES ANY
N/V. CALLS WITH NEEDS.

## 2020-11-25 NOTE — NUR
ASSESSMENT: CM REVIEWED CHART AND SPOKE WITH PT. PT IS S/P CERVICAL DISCETOMY.
PT REPORTS LIVING WITH HIS WIFE. PT REPORTS ENTERING THROUGH THE GARAGE AND
HAVING ABOUT 7 STEPS WITH HANDRAILS TO ONE LEVEL AND ANOTHER 7 STEPS WITH
HANDRAILS TO HIS BEDROOM. PT REPORTS HAVING A WALKER AND SHOWER CHAIR WITH A
[employer] [facility]. PT REPORTS NORMALLY BEING INDEPENDENT WITH ADLS. PT REQUESTING HH
AND STATES NO PREFERENCE OF AGENCY. SAMUEL SPOKE WITH [facility]
[phone] WHO REPORTS THEY CAN ACCEPT PT. REFERRAL SENT. THEY REQUEST TO
FAX DISCHARE ORDERS TO THEM [phone].

## 2020-11-26 VITALS — SYSTOLIC BLOOD PRESSURE: 142 MMHG | DIASTOLIC BLOOD PRESSURE: 77 MMHG

## 2020-11-26 VITALS — SYSTOLIC BLOOD PRESSURE: 125 MMHG | DIASTOLIC BLOOD PRESSURE: 69 MMHG

## 2020-11-26 VITALS — SYSTOLIC BLOOD PRESSURE: 123 MMHG | DIASTOLIC BLOOD PRESSURE: 67 MMHG

## 2020-11-26 VITALS — SYSTOLIC BLOOD PRESSURE: 134 MMHG | DIASTOLIC BLOOD PRESSURE: 74 MMHG

## 2020-11-26 LAB
ANION GAP SERPL CALC-SCNC: 6 MMOL/L (ref 7–16)
BASOPHILS NFR BLD AUTO: 0.4 % (ref 0–2)
BUN SERPL-MCNC: 13 MG/DL (ref 7–18)
CALCIUM SERPL-MCNC: 9.2 MG/DL (ref 8.5–10.1)
CHLORIDE SERPL-SCNC: 103 MMOL/L (ref 98–107)
CO2 SERPL-SCNC: 30 MMOL/L (ref 21–32)
CREAT SERPL-MCNC: 1 MG/DL (ref 0.7–1.3)
EOSINOPHIL NFR BLD: 1.5 % (ref 0–3)
ERYTHROCYTE [DISTWIDTH] IN BLOOD BY AUTOMATED COUNT: 12.7 % (ref 10.5–14.5)
GLUCOSE SERPL-MCNC: 97 MG/DL (ref 74–106)
GRANULOCYTES NFR BLD MANUAL: 67.6 % (ref 36–66)
HCT VFR BLD CALC: 33.7 % (ref 42–52)
HGB BLD-MCNC: 11.3 GM/DL (ref 14–18)
LYMPHOCYTES NFR BLD AUTO: 19.9 % (ref 24–44)
MCH RBC QN AUTO: 30.6 PG (ref 26–34)
MCHC RBC AUTO-ENTMCNC: 33.5 G/DL (ref 28–37)
MCV RBC: 91.2 FL (ref 80–100)
MONOCYTES NFR BLD: 10.6 % (ref 1–8)
NEUTROPHILS # BLD: 7.4 THOU/UL (ref 1.4–8.2)
PLATELET # BLD: 189 THOU/UL (ref 150–400)
POTASSIUM SERPL-SCNC: 4.4 MMOL/L (ref 3.5–5.1)
RBC # BLD AUTO: 3.69 MIL/UL (ref 4.5–6)
SODIUM SERPL-SCNC: 139 MMOL/L (ref 136–145)
WBC # BLD AUTO: 10.9 THOU/UL (ref 4–11)

## 2020-11-26 NOTE — NUR
PT BEEN SLEEPING MOST OF THE SHIFT. HE GETS IMPULSIVE AT TIMES. WALKS TO USE
THE BATHROOM. LUMBAR DRSG WITH SOME DRAINAGE. AFEBRILE. REPORTS SOME PAIN,
GIVEN PRN OXYCODONE..PT IS ON ROOM AIR, NO COUGH,SOA OR GI DISCOMFORT
REPORTED. FALL PREC IN PLACE.

## 2020-11-26 NOTE — NUR
DRESSING CHANGED TO BACK INCISION. SUPPLIES PACKED AND TO BE SENT WITH PATIENT
WHEN HE DISCHARGES. PATIENT WALKED THE UNIT WITH FAMILY MEMBER.

## 2020-11-26 NOTE — HC
[facility]
[address]
[place]   [address]                     CONSULTATION                  
_______________________________________________________________________________
 
Name:       EDUIN ECHAVARRIA                  Room #:         445-P       [facility] [place]  
.R.#:      [id number]                       Account #:      [id number]  
Admission:  11/24/20    Attend Phys:    Jaswinder Ordonez MD  
Discharge:              Date of Birth:  [birth year]  
                                                          Report #: [id number]
                                                                    [id number]   
_______________________________________________________________________________
THIS REPORT FOR:  
 
cc:  Jono Carrizales MD,Jono Em,Leif GUZMAN MD                                         ~
 
 
DATE OF SERVICE:  11/25/2020
 
 
HISTORY OF PRESENT ILLNESS:  This is a 75-year-old male patient who was
evaluated by me for headache.  The patient has a complicated history.  He had a
back surgery in September, it was the lumbar spine area.  Reviewing the records,
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
pseudomeningocele.  He fell about 2 weeks ago.  It is not clear why he fell.  He
had about 5-6 falls in last year.  He said that was because of severe spasms in
the hip area on the left side.  He is complaining of some nonspecific symptoms
on the left side.  He has headache there.  He also says he has dizziness on the
left side.  I am not sure what he means by that, but he has symptoms where he
hit his head. 
 
REVIEW OF SYSTEMS:  Positive for hypertension, hyperlipidemia, cardiac stent,
low back surgery.  He does have a history of hepatitis C, nose reconstruction,
colonoscopies, tendon surgery, bilateral knee replacement, lower eye lid
surgery.  That was his relevant 14-point review of system. 
 
PAST MEDICAL HISTORY:  Positive for back problem. 
 
FAMILY HISTORY:  Unremarkable. 
 
SOCIAL HISTORY:  He says he drinks about twice a week, about 2 alcoholic drinks.
 He does not smoke anymore. 
 
PHYSICAL EXAMINATION:  Indicate he is alert, responsive, able to follow simple
and complex command.  His memory and fund of knowledge looks his baseline.  He
is competent to make his decision.  His cranial nerve examination is
unremarkable.  His reflexes on knee could not be elicited because he had surgery
there.  His position sense is intact.  His tone is symmetrical.  He is a very
well-built individual.  His blood pressure is 128/72, respirations 17, pulse is
73, temperature is 97.3. 
 
LABORATORY DATA:  Indicate a white count of 15.8.  His symptoms are not really
the postural symptoms the best I can tell. 
 
IMPRESSION:  Posttraumatic headache.  I think it is highly desirable to do
imaging study in this patient.  It will be desirable to in fact go ahead and do
 
 
 
[address]   [address]                     CONSULTATION                  
_______________________________________________________________________________
 
Name:       EDUIN ECHAVARRIA                  Room #:         445-P       [facility] [place]  
[facility]#:      [id number]                       Account #:      [id number]  
Admission:  11/24/20    Attend Phys:    Jaswinder Ordonez MD  
Discharge:              Date of Birth:  [birth year]  
                                                          Report #: [id number]
                                                                    [id number]   
_______________________________________________________________________________
 
 
a CT angiogram of the head and neck as well as a noncontrast CT.  That will give
us a fairly good pictures of his CT and blood vessels, then we will rule out any
subdural or any dissection.  Later on, we can also do an MRI if necessary and if
symptoms do not resolve. 
 
I discussed that with him and recommended that, but the patient declined a CT or
CT angio.  First, he said he will not do it, but I told him that is the way to
do thing and that is what my recommendation is, but finally he says he does not
want me to do it at the moment and he wants to talk to his own family doctor and
if he wants to do that, then he will let the nurse know.  I did discuss with him
side effect of contrast and I told him that I can do it without contrast if that
is his worry.  He said that is not his worry, he just does not want to do any
more testing.  I did talk to his nurse and told her that he does not want to do
the testing. 
 
Please let us know if he changes his mind and want to proceed with the testing
and if he does, I will suggest doing a noncontrast CT of the head and then doing
a CT angio of the head and neck at the same time.  He should get some fluids to
wash off his dye that time even if his GFR is okay.  I spent more than 50
minutes of time taking care of this patient today and majority was spent
counseling and coordinating. 
 
Thank you very much for this referral and if you have any question, please feel
free to contact me.
Addendum.  This addendum is being added at the time of signing the note.  I
once again came and talked to the patient and the patient once again declined
any further neurological testing.  We will officially sign off.  Please call if
there are things changes
 
 
 
 
 
 
 
 
 
 
 
 
 
 
  <ELECTRONICALLY SIGNED>
   By: Leif Em MD         
  11/26/20     1533
D: 11/25/20 1609                           _____________________________________
T: 11/26/20 0126                           Leif Em MD           /nt

## 2020-11-27 VITALS — DIASTOLIC BLOOD PRESSURE: 72 MMHG | SYSTOLIC BLOOD PRESSURE: 128 MMHG

## 2020-11-27 LAB
ANION GAP SERPL CALC-SCNC: 5 MMOL/L (ref 7–16)
BASOPHILS NFR BLD AUTO: 0.5 % (ref 0–2)
BUN SERPL-MCNC: 10 MG/DL (ref 7–18)
CALCIUM SERPL-MCNC: 9.4 MG/DL (ref 8.5–10.1)
CHLORIDE SERPL-SCNC: 102 MMOL/L (ref 98–107)
CO2 SERPL-SCNC: 32 MMOL/L (ref 21–32)
CREAT SERPL-MCNC: 1.1 MG/DL (ref 0.7–1.3)
EOSINOPHIL NFR BLD: 2.7 % (ref 0–3)
ERYTHROCYTE [DISTWIDTH] IN BLOOD BY AUTOMATED COUNT: 12.7 % (ref 10.5–14.5)
GLUCOSE SERPL-MCNC: 93 MG/DL (ref 74–106)
GRANULOCYTES NFR BLD MANUAL: 61.9 % (ref 36–66)
HCT VFR BLD CALC: 36.1 % (ref 42–52)
HGB BLD-MCNC: 11.9 GM/DL (ref 14–18)
LYMPHOCYTES NFR BLD AUTO: 25.8 % (ref 24–44)
MCH RBC QN AUTO: 30 PG (ref 26–34)
MCHC RBC AUTO-ENTMCNC: 33 G/DL (ref 28–37)
MCV RBC: 90.9 FL (ref 80–100)
MONOCYTES NFR BLD: 9.1 % (ref 1–8)
NEUTROPHILS # BLD: 5.5 THOU/UL (ref 1.4–8.2)
PLATELET # BLD: 203 THOU/UL (ref 150–400)
POTASSIUM SERPL-SCNC: 4.7 MMOL/L (ref 3.5–5.1)
RBC # BLD AUTO: 3.97 MIL/UL (ref 4.5–6)
SODIUM SERPL-SCNC: 139 MMOL/L (ref 136–145)
WBC # BLD AUTO: 8.8 THOU/UL (ref 4–11)

## 2020-11-27 NOTE — NUR
PT CARE ASSUMED AT 0700. A&Ox4. IV PATENT WITH NO REDNESS OR EDEMA, SALINE
LOCKED. PT DISCHARGED WITH WIFE. DRESSING DRY AND INTACT. IV REMOVED. SCRIPTS
GIVEN. PER DR. CASILLAS PT WAS TO BE RE-EVALUATED BY PT TO SEE IF HE WOULD
QUALIFY FOR REHAB. PT DID NOT. HE WAS DISCHARGED HOME WITH HOMEHEALTH. SCRIPTS
GIVEN. NO FURTHER QUESTIONS.

## 2020-11-27 NOTE — NUR
ASSESSMENT COMPLETED. DRSG TO LOWER BACK WAS SATURATED AT THE START OF SHIFT,
IT WAS CHANGED. INCISION WITH NO REDNESS, STAPLES INTACT. PT WALKING TO THE
BATHROOM. VOIDING OKAY. DENIES ANY GI/ DISCOMFORT.GETTING OXYCODONE FOR PAIN
WITH RELIEF.